# Patient Record
Sex: MALE | Race: WHITE | NOT HISPANIC OR LATINO | Employment: FULL TIME | URBAN - METROPOLITAN AREA
[De-identification: names, ages, dates, MRNs, and addresses within clinical notes are randomized per-mention and may not be internally consistent; named-entity substitution may affect disease eponyms.]

---

## 2022-07-21 ENCOUNTER — OFFICE VISIT (OUTPATIENT)
Dept: URGENT CARE | Facility: CLINIC | Age: 37
End: 2022-07-21
Payer: COMMERCIAL

## 2022-07-21 VITALS
OXYGEN SATURATION: 96 % | HEIGHT: 78 IN | SYSTOLIC BLOOD PRESSURE: 147 MMHG | TEMPERATURE: 99 F | HEART RATE: 83 BPM | RESPIRATION RATE: 20 BRPM | DIASTOLIC BLOOD PRESSURE: 102 MMHG | WEIGHT: 315 LBS | BODY MASS INDEX: 36.45 KG/M2

## 2022-07-21 DIAGNOSIS — S99.922A FOOT INJURY, LEFT, INITIAL ENCOUNTER: ICD-10-CM

## 2022-07-21 DIAGNOSIS — S93.105A DISLOCATION OF PHALANX OF LEFT FOOT, INITIAL ENCOUNTER: Primary | ICD-10-CM

## 2022-07-21 PROCEDURE — 3008F BODY MASS INDEX DOCD: CPT | Mod: CPTII,S$GLB,, | Performed by: PHYSICIAN ASSISTANT

## 2022-07-21 PROCEDURE — 73630 X-RAY EXAM OF FOOT: CPT | Mod: LT,S$GLB,, | Performed by: RADIOLOGY

## 2022-07-21 PROCEDURE — 73660 XR TOE 2 OR MORE VIEWS LEFT: ICD-10-PCS | Mod: LT,S$GLB,, | Performed by: RADIOLOGY

## 2022-07-21 PROCEDURE — 1160F RVW MEDS BY RX/DR IN RCRD: CPT | Mod: CPTII,S$GLB,, | Performed by: PHYSICIAN ASSISTANT

## 2022-07-21 PROCEDURE — 4010F PR ACE/ARB THEARPY RXD/TAKEN: ICD-10-PCS | Mod: CPTII,S$GLB,, | Performed by: PHYSICIAN ASSISTANT

## 2022-07-21 PROCEDURE — 99204 PR OFFICE/OUTPT VISIT, NEW, LEVL IV, 45-59 MIN: ICD-10-PCS | Mod: S$GLB,,, | Performed by: PHYSICIAN ASSISTANT

## 2022-07-21 PROCEDURE — 73660 X-RAY EXAM OF TOE(S): CPT | Mod: LT,S$GLB,, | Performed by: RADIOLOGY

## 2022-07-21 PROCEDURE — 73630 XR FOOT COMPLETE 3 VIEW LEFT: ICD-10-PCS | Mod: LT,S$GLB,, | Performed by: RADIOLOGY

## 2022-07-21 PROCEDURE — 99204 OFFICE O/P NEW MOD 45 MIN: CPT | Mod: S$GLB,,, | Performed by: PHYSICIAN ASSISTANT

## 2022-07-21 PROCEDURE — 3008F PR BODY MASS INDEX (BMI) DOCUMENTED: ICD-10-PCS | Mod: CPTII,S$GLB,, | Performed by: PHYSICIAN ASSISTANT

## 2022-07-21 PROCEDURE — 3077F SYST BP >= 140 MM HG: CPT | Mod: CPTII,S$GLB,, | Performed by: PHYSICIAN ASSISTANT

## 2022-07-21 PROCEDURE — 1159F PR MEDICATION LIST DOCUMENTED IN MEDICAL RECORD: ICD-10-PCS | Mod: CPTII,S$GLB,, | Performed by: PHYSICIAN ASSISTANT

## 2022-07-21 PROCEDURE — 4010F ACE/ARB THERAPY RXD/TAKEN: CPT | Mod: CPTII,S$GLB,, | Performed by: PHYSICIAN ASSISTANT

## 2022-07-21 PROCEDURE — 3080F PR MOST RECENT DIASTOLIC BLOOD PRESSURE >= 90 MM HG: ICD-10-PCS | Mod: CPTII,S$GLB,, | Performed by: PHYSICIAN ASSISTANT

## 2022-07-21 PROCEDURE — 3077F PR MOST RECENT SYSTOLIC BLOOD PRESSURE >= 140 MM HG: ICD-10-PCS | Mod: CPTII,S$GLB,, | Performed by: PHYSICIAN ASSISTANT

## 2022-07-21 PROCEDURE — 1160F PR REVIEW ALL MEDS BY PRESCRIBER/CLIN PHARMACIST DOCUMENTED: ICD-10-PCS | Mod: CPTII,S$GLB,, | Performed by: PHYSICIAN ASSISTANT

## 2022-07-21 PROCEDURE — 3080F DIAST BP >= 90 MM HG: CPT | Mod: CPTII,S$GLB,, | Performed by: PHYSICIAN ASSISTANT

## 2022-07-21 PROCEDURE — 1159F MED LIST DOCD IN RCRD: CPT | Mod: CPTII,S$GLB,, | Performed by: PHYSICIAN ASSISTANT

## 2022-07-21 RX ORDER — LISINOPRIL 40 MG/1
40 TABLET ORAL DAILY
COMMUNITY
Start: 2022-07-20

## 2022-07-21 RX ORDER — HYDROCHLOROTHIAZIDE 12.5 MG/1
12.5 TABLET ORAL DAILY
COMMUNITY
Start: 2022-07-20

## 2022-07-21 RX ORDER — SERTRALINE HYDROCHLORIDE 50 MG/1
50 TABLET, FILM COATED ORAL 2 TIMES DAILY
COMMUNITY
Start: 2022-07-20

## 2022-07-21 RX ORDER — ALLOPURINOL 300 MG/1
300 TABLET ORAL DAILY
COMMUNITY
Start: 2022-07-20

## 2022-07-21 NOTE — PROGRESS NOTES
"Subjective:       Patient ID: Fran Leon is a 36 y.o. male.    Vitals:  height is 6' 7" (2.007 m) and weight is 181.4 kg (400 lb) (abnormal). His temperature is 98.5 °F (36.9 °C). His blood pressure is 147/102 (abnormal) and his pulse is 83. His respiration is 20 and oxygen saturation is 96%.     Chief Complaint: Toe Injury    Patient was at home at 1:00 a.m. this morning when he fell down 2 stairs, stubbing his right 3rd toe.     Toe Injury  This is a new problem. The current episode started today. The problem occurs constantly. The problem has been gradually worsening. Associated symptoms include arthralgias, joint swelling and myalgias. Pertinent negatives include no abdominal pain, anorexia, change in bowel habit, chest pain, chills, congestion, coughing, diaphoresis, fatigue, fever, headaches, nausea, neck pain, numbness, rash, sore throat, swollen glands, urinary symptoms, vertigo, visual change, vomiting or weakness. The symptoms are aggravated by standing and walking (movement). He has tried nothing for the symptoms.       Constitution: Negative for chills, sweating, fatigue and fever.   HENT: Negative for ear pain, congestion and sore throat.    Neck: Negative for neck pain, neck stiffness and painful lymph nodes.   Cardiovascular: Negative for chest pain, palpitations and sob on exertion.   Eyes: Negative for eye discharge, eye itching and eye pain.   Respiratory: Negative for cough, sputum production and shortness of breath.    Gastrointestinal: Negative for abdominal pain, nausea, vomiting and diarrhea.   Genitourinary: Negative for dysuria, hematuria and pelvic pain.   Musculoskeletal: Positive for trauma, joint pain, joint swelling, pain with walking and muscle ache. Negative for pain and muscle cramps.   Skin: Negative for pale, rash and wound.   Neurological: Negative for dizziness, history of vertigo, light-headedness, headaches and numbness.   Hematologic/Lymphatic: Negative for swollen lymph " nodes.       Objective:      Physical Exam   Constitutional: He is oriented to person, place, and time. He appears well-developed. He is cooperative. He does not appear ill. No distress.   HENT:   Head: Normocephalic and atraumatic.   Ears:   Right Ear: External ear normal.   Left Ear: External ear normal.   Nose: Nose normal.   Mouth/Throat: Oropharynx is clear and moist.   Eyes: Conjunctivae, EOM and lids are normal.   Neck: Trachea normal and phonation normal. Neck supple.   Cardiovascular: Regular rhythm and normal heart sounds.   No murmur heard.Exam reveals no gallop.   Pulmonary/Chest: Effort normal and breath sounds normal. No respiratory distress. He has no decreased breath sounds. He has no wheezes. He has no rhonchi. He has no rales.   Musculoskeletal:      Left foot: Decreased range of motion. Normal capillary refill. Tenderness, bony tenderness and deformity present. No swelling.        Feet:    Neurological: He is alert and oriented to person, place, and time.   Skin: Skin is warm, dry, intact, not diaphoretic and no rash. No bruising   Psychiatric: His speech is normal and behavior is normal. Judgment and thought content normal.   Nursing note and vitals reviewed.        Assessment:       1. Dislocation of phalanx of left foot, initial encounter    2. Foot injury, left, initial encounter        XR FOOT COMPLETE 3 VIEW LEFT    Result Date: 7/21/2022  EXAMINATION: XR FOOT COMPLETE 3 VIEW LEFT CLINICAL HISTORY: .  Unspecified injury of left foot, initial encounter TECHNIQUE: AP, lateral and oblique views of the left foot were performed. COMPARISON: None FINDINGS: Bones are well mineralized.  There is dislocation of the 3rd toe at the level of the PIP joint with displacement of the middle and distal phalanges towards the dorsal and lateral aspect with up to 5 mm overlap with respect to the PIP joint.  No displaced fracture definitively seen.  Mild degenerative change at the 1st MTP joint.  Lisfranc  articulation is congruent.  No destructive osseous process. No subcutaneous emphysema or radiodense retained foreign body.     Third PIP joint dislocation, as above. Electronically signed by: Rodo Damian MD Date:    07/21/2022 Time:    09:46    X-Ray Toe 2 or More Views Left    Result Date: 7/21/2022  EXAMINATION: XR TOE 2 OR MORE VIEWS LEFT CLINICAL HISTORY: Unspecified injury of left foot, initial encounter TECHNIQUE: Three views of the left toes were performed COMPARISON: 07/21/2022 none. FINDINGS: Post reduction view show satisfactory position alignment of the previous described 3rd PIP dislocation.     See above Electronically signed by: Howie Murillo MD Date:    07/21/2022 Time:    11:53    Plan:       Pt requires toe reduction - will sent to see Dr. Sahu at [facility] for reduction. Pt agrees with plan and left clinic. Patient care then taken over by Dr. Sahu.    Pt is referred here from [facility] for toe reduction. Toe reduction is achieved after digital block and confirmed with post with post procedure xray. Discussed results/diagnosis/plan with patient in clinic. Advised to f/u with PCP. All questions answered.     Dislocation of phalanx of left foot, initial encounter    Foot injury, left, initial encounter  -     XR FOOT COMPLETE 3 VIEW LEFT; Future; Expected date: 07/21/2022  -     X-Ray Toe 2 or More Views Left; Future; Expected date: 07/21/2022    Procedures;    Left 3rd toe PIP dislocation, Reduced after digital block using 1% lidocaine w/o epi.

## 2022-07-21 NOTE — PROCEDURES
Procedures;    Left 3rd toe PIP dislocation, Reduced after digital block using 1% lidocaine w/o epi.

## 2022-07-23 ENCOUNTER — TELEPHONE (OUTPATIENT)
Dept: URGENT CARE | Facility: CLINIC | Age: 37
End: 2022-07-23
Payer: COMMERCIAL